# Patient Record
Sex: MALE | URBAN - METROPOLITAN AREA
[De-identification: names, ages, dates, MRNs, and addresses within clinical notes are randomized per-mention and may not be internally consistent; named-entity substitution may affect disease eponyms.]

---

## 2021-10-04 ENCOUNTER — OFFICE VISIT (OUTPATIENT)
Dept: URBAN - METROPOLITAN AREA CLINIC 6 | Facility: CLINIC | Age: 76
End: 2021-10-04
Payer: MEDICARE

## 2021-10-04 DIAGNOSIS — H26.493 OTHER SECONDARY CATARACT, BILATERAL: ICD-10-CM

## 2021-10-04 DIAGNOSIS — H43.812 VITREOUS DEGENERATION, LEFT EYE: ICD-10-CM

## 2021-10-04 DIAGNOSIS — H35.371 PUCKERING OF MACULA, RIGHT EYE: ICD-10-CM

## 2021-10-04 DIAGNOSIS — H04.123 DRY EYE SYNDROME OF BILATERAL LACRIMAL GLANDS: Primary | ICD-10-CM

## 2021-10-04 PROCEDURE — 92014 COMPRE OPH EXAM EST PT 1/>: CPT | Performed by: OPTOMETRIST

## 2021-10-04 PROCEDURE — 92134 CPTRZ OPH DX IMG PST SGM RTA: CPT | Performed by: OPTOMETRIST

## 2021-10-04 PROCEDURE — 68761 CLOSE TEAR DUCT OPENING: CPT | Performed by: OPTOMETRIST

## 2021-10-04 ASSESSMENT — INTRAOCULAR PRESSURE
OS: 11
OD: 11

## 2021-10-04 NOTE — IMPRESSION/PLAN
Impression: Vitreous degeneration, left eye: H43.812. Plan: Posterior vitreous detachment (floater)  - reviewed the signs and symptoms of possible retinal detachment (flashes, floaters, change in peripheral vision, etc). Advised to contact us immediately for any of these or other new symptoms.

## 2021-10-04 NOTE — IMPRESSION/PLAN
Impression: Dry eye syndrome of bilateral lacrimal glands: H04.123. Plan: Emergency visit for Dry eye syndrome OU- Unable to  Restasis/Xiidra due to cost.  Dry eyes - not improving with use of topical treatment - reviewed the r/b/a of punctal plugs - placed Odyssey/BVI parasol plugs both inferior eyelids. Advised to contact us with any problems or concerns. Size: S  Lot: [id number]. Continue ATs OU.

## 2021-10-04 NOTE — IMPRESSION/PLAN
Impression: Puckering of macula, right eye: H35.371. Plan: Epiretinal membrane right eye - mOCT flat OU. Mild with no CME - Plan to observe for now without intervention.